# Patient Record
Sex: FEMALE | Race: WHITE | NOT HISPANIC OR LATINO | Employment: UNEMPLOYED | URBAN - METROPOLITAN AREA
[De-identification: names, ages, dates, MRNs, and addresses within clinical notes are randomized per-mention and may not be internally consistent; named-entity substitution may affect disease eponyms.]

---

## 2023-02-06 ENCOUNTER — HOSPITAL ENCOUNTER (OUTPATIENT)
Facility: MEDICAL CENTER | Age: 18
End: 2023-02-06
Attending: PHYSICIAN ASSISTANT
Payer: COMMERCIAL

## 2023-02-06 ENCOUNTER — OFFICE VISIT (OUTPATIENT)
Dept: URGENT CARE | Facility: CLINIC | Age: 18
End: 2023-02-06
Payer: COMMERCIAL

## 2023-02-06 VITALS
DIASTOLIC BLOOD PRESSURE: 62 MMHG | OXYGEN SATURATION: 98 % | RESPIRATION RATE: 14 BRPM | HEIGHT: 59 IN | TEMPERATURE: 98.1 F | BODY MASS INDEX: 20.14 KG/M2 | SYSTOLIC BLOOD PRESSURE: 102 MMHG | HEART RATE: 77 BPM | WEIGHT: 99.9 LBS

## 2023-02-06 DIAGNOSIS — R42 VERTIGO: ICD-10-CM

## 2023-02-06 DIAGNOSIS — J06.9 VIRAL URI WITH COUGH: ICD-10-CM

## 2023-02-06 PROCEDURE — 99204 OFFICE O/P NEW MOD 45 MIN: CPT | Performed by: PHYSICIAN ASSISTANT

## 2023-02-06 PROCEDURE — U0005 INFEC AGEN DETEC AMPLI PROBE: HCPCS

## 2023-02-06 PROCEDURE — U0003 INFECTIOUS AGENT DETECTION BY NUCLEIC ACID (DNA OR RNA); SEVERE ACUTE RESPIRATORY SYNDROME CORONAVIRUS 2 (SARS-COV-2) (CORONAVIRUS DISEASE [COVID-19]), AMPLIFIED PROBE TECHNIQUE, MAKING USE OF HIGH THROUGHPUT TECHNOLOGIES AS DESCRIBED BY CMS-2020-01-R: HCPCS

## 2023-02-06 RX ORDER — LAMOTRIGINE 25 MG/1
25 TABLET ORAL
COMMUNITY
Start: 2023-01-19 | End: 2023-11-14

## 2023-02-06 RX ORDER — DEXTROMETHORPHAN HYDROBROMIDE AND PROMETHAZINE HYDROCHLORIDE 15; 6.25 MG/5ML; MG/5ML
5 SYRUP ORAL 4 TIMES DAILY PRN
Qty: 118 ML | Refills: 0 | Status: SHIPPED | OUTPATIENT
Start: 2023-02-06 | End: 2023-11-14

## 2023-02-06 RX ORDER — ESCITALOPRAM OXALATE 20 MG/1
20 TABLET ORAL DAILY
COMMUNITY
Start: 2023-01-19 | End: 2023-11-14

## 2023-02-06 RX ORDER — BUSPIRONE HYDROCHLORIDE 10 MG/1
10 TABLET ORAL 2 TIMES DAILY
COMMUNITY
Start: 2023-01-19 | End: 2023-11-14

## 2023-02-06 RX ORDER — ESTAZOLAM 2 MG/1
1 TABLET ORAL DAILY
COMMUNITY
Start: 2022-12-20 | End: 2023-11-14

## 2023-02-06 RX ORDER — BENZONATATE 200 MG/1
200 CAPSULE ORAL 3 TIMES DAILY PRN
Qty: 60 CAPSULE | Refills: 0 | Status: SHIPPED | OUTPATIENT
Start: 2023-02-06

## 2023-02-06 ASSESSMENT — ENCOUNTER SYMPTOMS
RHINORRHEA: 1
HEMOPTYSIS: 0
FEVER: 0
DIZZINESS: 1
HEADACHES: 1
SORE THROAT: 1
SWEATS: 0
WHEEZING: 0
MYALGIAS: 0
COUGH: 1

## 2023-02-06 NOTE — PROGRESS NOTES
Subjective:   Domitila Valentine is a 17 y.o. female who presents for Dizziness (X3-4 days of dizziness, worse when turning head, ) and Cough (X1 day Cough, fatigue, congested, plugged ears sore throat, needs work note )        Dizziness  This is a new problem. Episode onset: several months. Episode frequency: occurs only with rapid position changes/head changes. The problem has been waxing and waning. Associated symptoms include coughing, headaches and a sore throat (no pain with swallowing). Pertinent negatives include no fever or myalgias. Associated symptoms comments: No vision changes, thunderclap headaches, cognitive changes, changes in speech, other sensory changes.. Nothing aggravates the symptoms. She has tried nothing for the symptoms. The treatment provided no relief.   Cough  This is a new problem. The current episode started yesterday. The problem has been rapidly worsening. The problem occurs every few minutes. The cough is Productive of sputum. Associated symptoms include ear congestion, headaches, nasal congestion, rhinorrhea and a sore throat (no pain with swallowing). Pertinent negatives include no ear pain, fever, hemoptysis, myalgias, sweats or wheezing. She has tried nothing for the symptoms. The treatment provided no relief. There is no history of asthma or pneumonia.   Review of Systems   Constitutional:  Negative for fever.   HENT:  Positive for rhinorrhea and sore throat (no pain with swallowing). Negative for ear pain.    Respiratory:  Positive for cough. Negative for hemoptysis and wheezing.    Musculoskeletal:  Negative for myalgias.   Neurological:  Positive for dizziness and headaches.     PMH:  has a past medical history of NEGATIVE HISTORY OF.  MEDS:   Current Outpatient Medications:     promethazine-dextromethorphan (PROMETHAZINE-DM) 6.25-15 MG/5ML syrup, Take 5 mL by mouth 4 times a day as needed for Cough., Disp: 118 mL, Rfl: 0    benzonatate (TESSALON) 200 MG capsule, Take 1 Capsule  "by mouth 3 times a day as needed for Cough., Disp: 60 Capsule, Rfl: 0    busPIRone (BUSPAR) 10 MG Tab tablet, Take 10 mg by mouth 2 times a day. (Patient not taking: Reported on 2/6/2023), Disp: , Rfl:     escitalopram (LEXAPRO) 20 MG tablet, Take 20 mg by mouth every day. (Patient not taking: Reported on 2/6/2023), Disp: , Rfl:     lamoTRIgine (LAMICTAL) 25 MG Tab, Take 25 mg by mouth at bedtime. (Patient not taking: Reported on 2/6/2023), Disp: , Rfl:     MICROGESTIN 1-20 MG-MCG per tablet, Take 1 Tablet by mouth every day. (Patient not taking: Reported on 2/6/2023), Disp: , Rfl:   ALLERGIES: No Known Allergies  SURGHX: History reviewed. No pertinent surgical history.  SOCHX:  reports that she has never smoked. She has never used smokeless tobacco. She reports that she does not drink alcohol and does not use drugs.  FH: Family history was reviewed, no pertinent findings to report   Objective:   /62   Pulse 77   Temp 36.7 °C (98.1 °F) (Temporal)   Resp 14   Ht 1.499 m (4' 11\")   Wt 45.3 kg (99 lb 14.4 oz)   SpO2 98%   BMI 20.18 kg/m²   Physical Exam  Vitals reviewed.   Constitutional:       General: She is not in acute distress.     Appearance: Normal appearance. She is well-developed. She is not toxic-appearing.   HENT:      Head: Normocephalic and atraumatic.      Right Ear: External ear normal. Tympanic membrane has decreased mobility.      Left Ear: External ear normal. Tympanic membrane has decreased mobility.      Nose: Congestion and rhinorrhea present.      Mouth/Throat:      Lips: Pink.      Mouth: Mucous membranes are moist.      Pharynx: Oropharynx is clear. Uvula midline. Posterior oropharyngeal erythema (Mild) present.   Cardiovascular:      Rate and Rhythm: Normal rate and regular rhythm.      Heart sounds: Normal heart sounds, S1 normal and S2 normal.   Pulmonary:      Effort: Pulmonary effort is normal. No respiratory distress.      Breath sounds: Normal breath sounds. No stridor. No " decreased breath sounds, wheezing, rhonchi or rales.   Lymphadenopathy:      Cervical: No cervical adenopathy.      Right cervical: No superficial or posterior cervical adenopathy.     Left cervical: No superficial or posterior cervical adenopathy.   Skin:     General: Skin is dry.   Neurological:      Comments: ANO x3.  Cranial nerves II through XII grossly intact.  Upper and lower extremity strength 5 out of 5 bilaterally.  Upper and lower extremity sensation intact and equal bilaterally.  Negative heel-to-shin testing.  Negative pronator drift.  Negative Romberg.  No ataxia.   Psychiatric:         Speech: Speech normal.         Behavior: Behavior normal.         Assessment/Plan:   1. Viral URI with cough  - SARS-CoV-2 PCR (24 hour In-House): Collect NP swab in VTM; Future  - promethazine-dextromethorphan (PROMETHAZINE-DM) 6.25-15 MG/5ML syrup; Take 5 mL by mouth 4 times a day as needed for Cough.  Dispense: 118 mL; Refill: 0  - benzonatate (TESSALON) 200 MG capsule; Take 1 Capsule by mouth 3 times a day as needed for Cough.  Dispense: 60 Capsule; Refill: 0    2. Vertigo  - Referral to Physical Therapy  - Referral to establish with [facility] PCP    Other orders  - busPIRone (BUSPAR) 10 MG Tab tablet; Take 10 mg by mouth 2 times a day. (Patient not taking: Reported on 2/6/2023)  - escitalopram (LEXAPRO) 20 MG tablet; Take 20 mg by mouth every day. (Patient not taking: Reported on 2/6/2023)  - lamoTRIgine (LAMICTAL) 25 MG Tab; Take 25 mg by mouth at bedtime. (Patient not taking: Reported on 2/6/2023)  - MICROGESTIN 1-20 MG-MCG per tablet; Take 1 Tablet by mouth every day. (Patient not taking: Reported on 2/6/2023)      1.  Discussed with patient signs and symptoms most likely are due to a viral etiology.     Test for COVID-19. We will call/message back for results and appropriate further instructions. Instructed to sign up for [employer] if they have not already. Result will be released to [employer] application.    Symptomatic and supportive care:   Plenty of oral hydration and rest   Over the counter cough suppressant as directed.  Tylenol or ibuprofen for pain and fever as directed.   Saline nasal spray and Flonase as a decongestant.   Quarantine in accordance with CDC guidelines.  Return precautions reviewed.    Differential diagnosis, natural history, supportive care, and indications for immediate follow-up discussed.    2.  Patient has had waxing and waning vertigo for the last several months.  Neurological examination within normal limits and symptoms consistent with peripheral etiology.  Clinical suspicion for potentially emergent intracranial abnormality such as mass/lesion or CVA low at this time.    Patient referred to vestibular therapy for further evaluation and management.  We will also have her reestablish with primary care for further evaluation and management.  Red flag signs and symptoms reviewed at length with patient and she was given strict ED precautions.

## 2023-02-06 NOTE — LETTER
February 6, 2023    To Whom It May Concern:         This is confirmation that Domitila Meghan Serge attended her scheduled appointment with Tigre Antonio P.A.-C. on 2/06/23. Please excuse her from work on 2/7-2/9/23.         If you have any questions please do not hesitate to call me at the phone number listed below.    Sincerely,          Tigre Antonio P.A.-C.  [phone]

## 2023-02-07 ENCOUNTER — TELEPHONE (OUTPATIENT)
Dept: HEALTH INFORMATION MANAGEMENT | Facility: OTHER | Age: 18
End: 2023-02-07
Payer: COMMERCIAL

## 2023-02-07 DIAGNOSIS — J06.9 VIRAL URI WITH COUGH: ICD-10-CM

## 2023-02-08 LAB
SARS-COV-2 RNA RESP QL NAA+PROBE: NOTDETECTED
SPECIMEN SOURCE: NORMAL

## 2023-11-14 ENCOUNTER — OFFICE VISIT (OUTPATIENT)
Dept: URGENT CARE | Facility: CLINIC | Age: 18
End: 2023-11-14
Payer: COMMERCIAL

## 2023-11-14 ENCOUNTER — APPOINTMENT (OUTPATIENT)
Dept: URGENT CARE | Facility: CLINIC | Age: 18
End: 2023-11-14
Payer: COMMERCIAL

## 2023-11-14 ENCOUNTER — TELEPHONE (OUTPATIENT)
Dept: URGENT CARE | Facility: CLINIC | Age: 18
End: 2023-11-14

## 2023-11-14 VITALS
WEIGHT: 90 LBS | SYSTOLIC BLOOD PRESSURE: 110 MMHG | HEIGHT: 59 IN | TEMPERATURE: 98.6 F | OXYGEN SATURATION: 94 % | DIASTOLIC BLOOD PRESSURE: 76 MMHG | RESPIRATION RATE: 18 BRPM | BODY MASS INDEX: 18.14 KG/M2 | HEART RATE: 96 BPM

## 2023-11-14 DIAGNOSIS — J03.90 EXUDATIVE TONSILLITIS: ICD-10-CM

## 2023-11-14 LAB
FLUAV RNA SPEC QL NAA+PROBE: NEGATIVE
FLUBV RNA SPEC QL NAA+PROBE: NEGATIVE
RSV RNA SPEC QL NAA+PROBE: NEGATIVE
S PYO DNA SPEC NAA+PROBE: NOT DETECTED
SARS-COV-2 RNA RESP QL NAA+PROBE: NEGATIVE

## 2023-11-14 PROCEDURE — 99213 OFFICE O/P EST LOW 20 MIN: CPT | Performed by: NURSE PRACTITIONER

## 2023-11-14 PROCEDURE — 87651 STREP A DNA AMP PROBE: CPT | Performed by: NURSE PRACTITIONER

## 2023-11-14 PROCEDURE — 0241U POCT CEPHEID COV-2, FLU A/B, RSV - PCR: CPT | Performed by: NURSE PRACTITIONER

## 2023-11-14 PROCEDURE — 3078F DIAST BP <80 MM HG: CPT | Performed by: NURSE PRACTITIONER

## 2023-11-14 PROCEDURE — 3074F SYST BP LT 130 MM HG: CPT | Performed by: NURSE PRACTITIONER

## 2023-11-14 RX ORDER — AMOXICILLIN 500 MG/1
500 CAPSULE ORAL 2 TIMES DAILY
Qty: 20 CAPSULE | Refills: 0 | Status: SHIPPED | OUTPATIENT
Start: 2023-11-14 | End: 2023-11-24

## 2023-11-14 RX ORDER — DEXAMETHASONE SODIUM PHOSPHATE 10 MG/ML
10 INJECTION INTRAMUSCULAR; INTRAVENOUS ONCE
Status: COMPLETED | OUTPATIENT
Start: 2023-11-14 | End: 2023-11-14

## 2023-11-14 RX ADMIN — DEXAMETHASONE SODIUM PHOSPHATE 10 MG: 10 INJECTION INTRAMUSCULAR; INTRAVENOUS at 12:36

## 2023-11-14 ASSESSMENT — FIBROSIS 4 INDEX: FIB4 SCORE: 0.68

## 2023-11-14 NOTE — PROGRESS NOTES
Date: 11/14/23     Chief Complaint:    Chief Complaint   Patient presents with    Pharyngitis     Sore throat, body aches, x 2 days        History of Present Illness: 18 y.o. female  presents 2-day history of sore throat with painful swallowing fever body aches and ear pressure causing her to feel dizzy at times.  She does have painful swallowing.  She is also having some nausea no vomiting or diarrhea.  She has been using OTC cold medications to help manage her symptoms.  No known sick contacts.  No other household members are sick.  She denies any severe shortness of breath chest pain no leg swelling.    ROS:  As stated in HPI       Medical/SX/ Social History:  Reviewed per chart    Medications:    Current Outpatient Medications on File Prior to Visit   Medication Sig Dispense Refill    benzonatate (TESSALON) 200 MG capsule Take 1 Capsule by mouth 3 times a day as needed for Cough. 60 Capsule 0     No current facility-administered medications on file prior to visit.        Allergies:    Patient has no known allergies.     Problem list, medications, and allergies reviewed by myself today in Epic       Physical Exam:  Vitals:    11/14/23 1143   BP: 110/76   Pulse: 96   Resp: 18   Temp: 37 °C (98.6 °F)   SpO2: 94%        Physical Exam  Vitals reviewed.   Constitutional:       General: She is not in acute distress.     Appearance: Normal appearance. She is well-developed. She is not toxic-appearing.   HENT:      Head: Normocephalic and atraumatic.      Right Ear: Tympanic membrane, ear canal and external ear normal.      Left Ear: Tympanic membrane, ear canal and external ear normal.      Nose: Congestion and rhinorrhea present.      Mouth/Throat:      Lips: Pink.      Mouth: Mucous membranes are moist.      Pharynx: Pharyngeal swelling, oropharyngeal exudate and posterior oropharyngeal erythema present.      Tonsils: Tonsillar exudate present. No tonsillar abscesses. 3+ on the right. 3+ on the left.   Eyes:       General: Lids are normal. Gaze aligned appropriately. No allergic shiner or scleral icterus.     Extraocular Movements: Extraocular movements intact.      Conjunctiva/sclera: Conjunctivae normal.      Pupils: Pupils are equal, round, and reactive to light.   Cardiovascular:      Rate and Rhythm: Normal rate and regular rhythm.      Pulses:           Radial pulses are 2+ on the right side and 2+ on the left side.      Heart sounds: Normal heart sounds.   Pulmonary:      Effort: Pulmonary effort is normal.      Breath sounds: Normal breath sounds. No wheezing.   Musculoskeletal:      Right lower leg: No edema.      Left lower leg: No edema.   Lymphadenopathy:      Cervical: Cervical adenopathy present.      Right cervical: Superficial cervical adenopathy present.      Left cervical: Superficial cervical adenopathy present.   Skin:     General: Skin is warm.      Capillary Refill: Capillary refill takes less than 2 seconds.      Coloration: Skin is not cyanotic or pale.      Findings: No rash.   Neurological:      Mental Status: She is alert.      Gait: Gait is intact.   Psychiatric:         Behavior: Behavior normal. Behavior is cooperative.          Diagnostics:      Recent Results (from the past 24 hour(s))   POCT CEPHEID GROUP A STREP - PCR    Collection Time: 11/14/23 11:14 AM   Result Value Ref Range    POC Group A Strep, PCR Not Detected Not Detected, Invalid   POCT CoV-2, Flu A/B, RSV by PCR    Collection Time: 11/14/23 11:14 AM   Result Value Ref Range    SARS-CoV-2 by PCR Negative Negative, Invalid    Influenza virus A RNA Negative Negative, Invalid    Influenza virus B, PCR Negative Negative, Invalid    RSV, PCR Negative Negative, Invalid         Diagnostics interpreted by myself.      Medical Decision Making / Plan :  I personally reviewed prior external notes and test results pertinent to today's visit. Pt is clinically stable at today's acute urgent care visit.  Patient appears nontoxic with no acute  distress noted. Appropriate for outpatient care at this time. The patient remained stable during the urgent care visit.     Pleasant 18 y.o. female  presented clinic with sore throat fever body aches in the absence of cough.  Consistent with strep pharyngitis infection.  Although PCR testing is negative we will treat for exudative tonsillitis as exam findings are consistent with strep throat.  Advised to finish antibiotics in their entirety.  Due to his significant tonsillar swelling did also give in clinic Decadron to help improve swelling and pain.  Shared decision-making was utilized with patient for treatment plan. Differential Diagnosis, natural history, and supportive care discussed. Did advise patient on conservative measures for management of symptoms.  Patient is agreeable to pursue adequate rest, adequate hydration, saltwater gargle and Neti pot for any symptoms of upper respiratory congestion.  Over-the-counter analgesia and antipyretics on a p.r.n. basis as needed for pain and fever.  Did discuss over-the-counter cough medications.      1. Exudative tonsillitis    - POCT CEPHEID GROUP A STREP - PCR  - POCT CoV-2, Flu A/B, RSV by PCR  - amoxicillin (AMOXIL) 500 MG Cap; Take 1 Capsule by mouth 2 times a day for 10 days.  Dispense: 20 Capsule; Refill: 0  - dexamethasone (Decadron) injection (check route below) 10 mg        Medication discussed included indication for use and the potential benefits and side effects. Education was provided regarding the aforementioned assessments.  All of the patient's questions were answered to their satisfaction at the time of discharge. Patient was encouraged to monitor symptoms closely. Those signs and symptoms which would warrant concern and mandate seeking a higher level of service through the emergency department discussed at length.  Patient stated agreement and understanding of this plan of care.        Disposition:  Patient was discharged in stable  condition.    Voice Recognition Disclaimer: Portions of this document were created using voice recognition software. The software does have a chance of producing errors of grammar and possibly content. I have made every reasonable attempt to correct obvious errors, but there may be errors of grammar and possibly content that I did not discover before finalizing the documentation.    DUNIA Euceda.